# Patient Record
Sex: MALE | Employment: FULL TIME | URBAN - METROPOLITAN AREA
[De-identification: names, ages, dates, MRNs, and addresses within clinical notes are randomized per-mention and may not be internally consistent; named-entity substitution may affect disease eponyms.]

---

## 2017-03-16 ENCOUNTER — OFFICE VISIT (OUTPATIENT)
Dept: DERMATOLOGY | Facility: CLINIC | Age: 37
End: 2017-03-16

## 2017-03-16 DIAGNOSIS — L21.9 DERMATITIS, SEBORRHEIC: Primary | ICD-10-CM

## 2017-03-16 RX ORDER — CLOBETASOL PROPIONATE 0.05 G/100ML
SHAMPOO TOPICAL
Qty: 1 BOTTLE | Refills: 11 | Status: SHIPPED
Start: 2017-03-16 | End: 2019-08-20

## 2017-03-16 ASSESSMENT — PAIN SCALES - GENERAL: PAINLEVEL: NO PAIN (0)

## 2017-03-16 NOTE — LETTER
"3/16/2017       RE: Henrik Dempsey  [address]  [address]  [facility] [address]     Dear Colleague,    Thank you for referring your patient, Henrik Dempsey, to the [facility] DERMATOLOGY at [facility]. Please see a copy of my visit note below.    [facility] Dermatology Note      Dermatology Problem List:  1. Seborrheic dermatitis: clobetasol shampoo    Encounter Date: Mar 16, 2017    CC:   Chief Complaint   Patient presents with     Derm Problem     Henrik is here today for dandruff.  Says he has had it for \"years.\"           History of Present Illness:  Mr. Henrik Dempsey is a 36 year old male who presents in consultation for dandruff. Patient states he has had problems with dandruff for the last several years. This is accompanied by scalp discomfort and itching. Pt states he has tried ketoconazole shampoo, selenium sulfide shampoo, H&S shampoo. These will briefly control hsi symptoms but then significantly dry out his scalp, worsen his symptoms, and he will no longer be able to tolerate. Pt states he will sometimes get dry skin behind his ears as well. Pt otherwise feels well without any changes in general state of health. Denies any new, growing, changing, bleeding, or otherwise concerning/symptomatic skin findings. No other questions or concerns today.       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 The patient denies use of tanning beds.    Family History:  There is no family history of skin cancer.    Medications:  Current Outpatient Prescriptions   Medication Sig Dispense Refill     " acetaminophen (TYLENOL) 325 MG tablet Take 325-650 mg by mouth every 6 hours as needed for mild pain Reported on 3/16/2017          Allergies   Allergen Reactions     Penicillins Hives         Review of Systems:  -As per HPI  -Constitutional: The patient denies fatigue, fevers, chills, unintended weight loss, and night sweats.  -HEENT: Patient denies nonhealing oral sores.  -Skin: As above in HPI. No additional skin concerns.    Physical exam:  Vitals: There were no vitals taken for this visit.  GEN: This is a well developed, well-nourished female in no acute distress, in a pleasant mood.    SKIN: Focused examination of the scalp and face was performed.  - diffuse flaking scale throughout scalp  - no erythema or inflammation of scalp noted  - mild flaking scale involving eyebrows   - no other lesions of concern on areas examined.     Impression/Plan:  1. Seborrheic dermatitis: involving scalp. discussed various treatment options including ketoconazole, selenium sulfide, zinc based shampoos, and topical steroids. Patient has failed the aforementioned shampoos which actually seem to worsen his symptoms. Will plan to begin treatment with clobetasol shampoo with follow up PRN.     Begin clobetasol shampoo    Begin 2x weekly and increase to every other day if tolerating well    RTC PRN if no improvement and we will         Follow-up prn for new or changing lesions.       Dr. Titus Gomez staffed the patient.    Staff Involved:  Resident(Brian Patel)/Staff(as above)    I have seen and examined this patient and agree with the assessment and plan as documented in the resident's note.    Titus Gomez MD  Dermatology Attending      Again, thank you for allowing me to participate in the care of your patient.      Sincerely,    Titus Gomez MD

## 2017-03-16 NOTE — NURSING NOTE
"Dermatology Rooming Note    Henrik Dempsey's goals for this visit include:   Chief Complaint   Patient presents with     Derm Problem     Henrik is here today for dandruff.  Says he has had it for \"years.\"         Kirti Sun LPN    "

## 2017-03-16 NOTE — PROGRESS NOTES
"[facility] Dermatology Note      Dermatology Problem List:  1. Seborrheic dermatitis: clobetasol shampoo    Encounter Date: Mar 16, 2017    CC:   Chief Complaint   Patient presents with     Derm Problem     Henrik is here today for dandruff.  Says he has had it for \"years.\"           History of Present Illness:  Mr. Henrik Dempsey is a 36 year old male who presents in consultation for dandruff. Patient states he has had problems with dandruff for the last several years. This is accompanied by scalp discomfort and itching. Pt states he has tried ketoconazole shampoo, selenium sulfide shampoo, H&S shampoo. These will briefly control hsi symptoms but then significantly dry out his scalp, worsen his symptoms, and he will no longer be able to tolerate. Pt states he will sometimes get dry skin behind his ears as well. Pt otherwise feels well without any changes in general state of health. Denies any new, growing, changing, bleeding, or otherwise concerning/symptomatic skin findings. No other questions or concerns today.       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 The patient denies use of tanning beds.    Family History:  There is no family history of skin cancer.    Medications:  Current Outpatient Prescriptions   Medication Sig Dispense Refill     acetaminophen (TYLENOL) 325 MG tablet Take 325-650 mg by mouth every 6 hours as needed for mild pain Reported on 3/16/2017          Allergies   Allergen Reactions     Penicillins Hives         Review of Systems:  -As per HPI  -Constitutional: The patient denies fatigue, fevers, chills, " unintended weight loss, and night sweats.  -HEENT: Patient denies nonhealing oral sores.  -Skin: As above in HPI. No additional skin concerns.    Physical exam:  Vitals: There were no vitals taken for this visit.  GEN: This is a well developed, well-nourished female in no acute distress, in a pleasant mood.    SKIN: Focused examination of the scalp and face was performed.  - diffuse flaking scale throughout scalp  - no erythema or inflammation of scalp noted  - mild flaking scale involving eyebrows   - no other lesions of concern on areas examined.     Impression/Plan:  1. Seborrheic dermatitis: involving scalp. discussed various treatment options including ketoconazole, selenium sulfide, zinc based shampoos, and topical steroids. Patient has failed the aforementioned shampoos which actually seem to worsen his symptoms. Will plan to begin treatment with clobetasol shampoo with follow up PRN.     Begin clobetasol shampoo    Begin 2x weekly and increase to every other day if tolerating well    RTC PRN if no improvement and we will         Follow-up prn for new or changing lesions.       Dr. Titus Gomez staffed the patient.    Staff Involved:  Resident(Brian Patel)/Staff(as above)    I have seen and examined this patient and agree with the assessment and plan as documented in the resident's note.    Titus Gomez MD  Dermatology Attending

## 2017-03-16 NOTE — MR AVS SNAPSHOT
After Visit Summary   3/16/2017    Henrik Dempsey    MRN: [id number]           Patient Information     Date Of Birth          1980        Visit Information        Provider Department      3/16/2017 3:30 PM Titus Gomez MD [facility] Dermatology        Today's Diagnoses     Dermatitis, seborrheic    -  1       Follow-ups after your visit        Follow-up notes from your care team     Return if symptoms worsen or fail to improve.      Who to contact     Please call your clinic at [phone] to:    Ask questions about your health    Make or cancel appointments    Discuss your medicines    Learn about your test results    Speak to your doctor   If you have compliments or concerns about an experience at your clinic, or if you wish to file a complaint, please contact [facility] Patient Relations at [phone] or email us at [email or web address]         Additional Information About Your Visit        Care EveryWhere ID     This is your Care EveryWhere ID. This could be used by other organizations to access your [place] medical records  [id number]         Blood Pressure from Last 3 Encounters:   04/11/17 120/83   01/26/15 112/80    Weight from Last 3 Encounters:   04/11/17 68 kg (150 lb)   01/26/15 66.2 kg (146 lb)              Today, you had the following     No orders found for display         Today's Medication Changes          These changes are accurate as of: 3/16/17 11:59 PM.  If you have any questions, ask your nurse or doctor.               Start taking these medicines.        Dose/Directions    clobetasol propionate 0.05 % Sham   Used for:  Dermatitis, seborrheic   Started by:  Titus Gomez MD        Apply sparingly to dry scalp twice weekly, if tolerating well, OK to increase to every other day.  Leave in place for 15 minutes then add water, lather and rinse thoroughly.   Quantity:  1 Bottle   Refills:  11            Where to get your  medicines      [id number] - [place], [place] - [address]  [address], [facility] [address]     Phone:  [phone]     clobetasol propionate 0.05 % Sham                Primary Care Provider    None       No address on file        Thank you!     Thank you for choosing [facility] DERMATOLOGY  for your care. Our goal is always to provide you with excellent care. Hearing back from our patients is one way we can continue to improve our services. Please take a few minutes to complete the written survey that you may receive in the mail after your visit with us. Thank you!             Your Updated Medication List - Protect others around you: Learn how to safely use, store and throw away your medicines at www.disposemymeds.org.          This list is accurate as of: 3/16/17 11:59 PM.  Always use your most recent med list.                   Brand Name Dispense Instructions for use    clobetasol propionate 0.05 % Sham     1 Bottle    Apply sparingly to dry scalp twice weekly, if tolerating well, OK to increase to every other day.  Leave in place for 15 minutes then add water, lather and rinse thoroughly.       TYLENOL 325 MG tablet   Generic drug:  acetaminophen      Take 325-650 mg by mouth every 6 hours as needed for mild pain Reported on 3/16/2017

## 2017-03-16 NOTE — LETTER
Date:April 18, 2017      Patient was self referred, no letter generated. Do not send.        [facility] Health Information

## 2017-04-11 ENCOUNTER — OFFICE VISIT (OUTPATIENT)
Dept: OTOLARYNGOLOGY | Facility: CLINIC | Age: 37
End: 2017-04-11
Payer: COMMERCIAL

## 2017-04-11 ENCOUNTER — OFFICE VISIT (OUTPATIENT)
Dept: AUDIOLOGY | Facility: CLINIC | Age: 37
End: 2017-04-11
Payer: COMMERCIAL

## 2017-04-11 VITALS
WEIGHT: 150 LBS | SYSTOLIC BLOOD PRESSURE: 120 MMHG | DIASTOLIC BLOOD PRESSURE: 83 MMHG | HEIGHT: 66 IN | BODY MASS INDEX: 24.11 KG/M2 | HEART RATE: 67 BPM

## 2017-04-11 DIAGNOSIS — H93.293 ABNORMAL AUDITORY PERCEPTION OF BOTH EARS: Primary | ICD-10-CM

## 2017-04-11 DIAGNOSIS — H69.93 DYSFUNCTION OF EUSTACHIAN TUBE, BILATERAL: Primary | ICD-10-CM

## 2017-04-11 PROCEDURE — 99202 OFFICE O/P NEW SF 15 MIN: CPT | Mod: 25 | Performed by: OTOLARYNGOLOGY

## 2017-04-11 PROCEDURE — 92511 NASOPHARYNGOSCOPY: CPT | Performed by: OTOLARYNGOLOGY

## 2017-04-11 PROCEDURE — 92550 TYMPANOMETRY & REFLEX THRESH: CPT | Performed by: AUDIOLOGIST

## 2017-04-11 PROCEDURE — 92552 PURE TONE AUDIOMETRY AIR: CPT | Performed by: AUDIOLOGIST

## 2017-04-11 PROCEDURE — 92555 SPEECH THRESHOLD AUDIOMETRY: CPT | Performed by: AUDIOLOGIST

## 2017-04-11 RX ORDER — FLUTICASONE PROPIONATE 50 MCG
1-2 SPRAY, SUSPENSION (ML) NASAL DAILY
Qty: 1 BOTTLE | Refills: 11 | Status: SHIPPED | OUTPATIENT
Start: 2017-04-11 | End: 2019-08-20

## 2017-04-11 ASSESSMENT — ENCOUNTER SYMPTOMS
BRUISES/BLEEDS EASILY: 0
HEARTBURN: 0
VOMITING: 0
DIZZINESS: 0
COUGH: 0
TREMORS: 0
HEMOPTYSIS: 0
TINGLING: 0
BLURRED VISION: 0
CONSTITUTIONAL NEGATIVE: 1
NAUSEA: 0
DOUBLE VISION: 0
HEADACHES: 0

## 2017-04-11 NOTE — PROGRESS NOTES
HPI    This pleasant patient is having ear fullness, pressure. He is a physician, endocrinologist here. Especially in the left for the past several months. Noticed a small lymph node in the mastoid region. Denies any drainage, pain, tinnitus or vertigo. No bleeding from nose, no environmental allergies. He has no hx of hypertension or thyroid issues.    Review of Systems   Constitutional: Negative.    HENT: Negative for ear discharge, ear pain, hearing loss and tinnitus.    Eyes: Negative for blurred vision and double vision.   Respiratory: Negative for cough and hemoptysis.    Gastrointestinal: Negative for heartburn, nausea and vomiting.   Skin: Negative.    Neurological: Negative for dizziness, tingling, tremors and headaches.   Endo/Heme/Allergies: Negative for environmental allergies. Does not bruise/bleed easily.         Physical Exam   Constitutional: He is well-developed, well-nourished, and in no distress.   HENT:   Head: Normocephalic and atraumatic.   Right Ear: Tympanic membrane, external ear and ear canal normal. No drainage, swelling or tenderness. No middle ear effusion. No decreased hearing is noted.   Left Ear: Tympanic membrane, external ear and ear canal normal. No drainage, swelling or tenderness.  No middle ear effusion. No decreased hearing is noted.   Nose: Nose normal. No mucosal edema.   Mouth/Throat: Uvula is midline, oropharynx is clear and moist and mucous membranes are normal. No oropharyngeal exudate or posterior oropharyngeal edema.   Eyes: Pupils are equal, round, and reactive to light.   Neck: Neck supple. No tracheal deviation present. No thyromegaly present.   Lymphadenopathy:     He has no cervical adenopathy.     Diagnostic nasal endoscopy:     He was seen in the room and identified. Pros and cons of the procedure were explained to the patient. The procedure and its alternatives were explained to the patient in lay terms. His questions were answered. His symptoms required a  diagnostic endoscopic evaluation under local anesthesia. After obtaining an informed consent from the patient, 3% Lidocaine with 1: 100.000 epinephrine spray was applied to each nostril. Then a  scopic exam was performed with rigid scope. Septum is in the midline. Ostiomeatal complexes are free of disease. No pus or polyp seen. Inferior turbinates are enlarged. Nasopharynx is normal. Rosenmüller fossa and torus tubarius are normal. He tolerated the procedure well and left the room with no complications.    A/P  Hearing WNLs. Excellent word recognition. Tymps WNls. Reflexes are present.  ETD: aural fullness: CBC requested to r/o anemia.   I will him a topical nasal steroid once a day and see him in the f/u.

## 2017-04-11 NOTE — MR AVS SNAPSHOT
After Visit Summary   4/11/2017    Henrik Dempsey    MRN: [id number]           Patient Information     Date Of Birth          1980        Visit Information        Provider Department      4/11/2017 11:00 AM Marques Santamaria MD [facility]        Today's Diagnoses     Dysfunction of eustachian tube, bilateral    -  1       Follow-ups after your visit        Additional Services     AUDIOLOGY ADULT REFERRAL       Your provider has referred you to: [facility]: [facility] [phone]   [email or web address]    Treatment:  Evaluation & Treatment  Specialty Testing:  Audiogram w/Tymps and Reflexes    Please be aware that coverage of these services is subject to the terms and limitations of your health insurance plan.  Call member services at your health plan with any benefit or coverage questions.      Please bring the following to your appointment:  >>   Any x-rays, CTs or MRIs which have been performed.  Contact the facility where they were done to arrange for  prior to your scheduled appointment.   >>   List of current medications  >>   This referral request   >>   Any documents/labs given to you for this referral                  Future tests that were ordered for you today     Open Future Orders        Priority Expected Expires Ordered    CBC with platelets and differential Routine 4/11/2017 4/11/2018 4/11/2017            Who to contact     If you have questions or need follow up information about today's clinic visit or your schedule please contact [facility] directly at [phone].  Normal or non-critical lab and imaging results will be communicated to you by MyChart, letter or phone within 4 business days after the clinic has received the results. If you do not hear from us within 7 days, please contact the clinic through MyChart or phone. If you have a critical or  "abnormal lab result, we will notify you by phone as soon as possible.  Submit refill requests through [email or web address] or call your pharmacy and they will forward the refill request to us. Please allow 3 business days for your refill to be completed.          Additional Information About Your Visit        Care EveryWhere ID     This is your Care EveryWhere ID. This could be used by other organizations to access your [place] medical records  [id number]        Your Vitals Were     Pulse Height BMI (Body Mass Index)             67 1.676 m (5' 6\") 24.21 kg/m2          Blood Pressure from Last 3 Encounters:   04/11/17 120/83   01/26/15 112/80    Weight from Last 3 Encounters:   04/11/17 68 kg (150 lb)   01/26/15 66.2 kg (146 lb)              We Performed the Following     AUDIOLOGY ADULT REFERRAL     Nasal Endoscopy, Diag          Today's Medication Changes          These changes are accurate as of: 4/11/17  2:00 PM.  If you have any questions, ask your nurse or doctor.               Start taking these medicines.        Dose/Directions    fluticasone 50 MCG/ACT spray   Commonly known as:  FLONASE   Used for:  Dysfunction of eustachian tube, bilateral   Started by:  Marques Santamaria MD        Dose:  1-2 spray   Spray 1-2 sprays into both nostrils daily   Quantity:  1 Bottle   Refills:  11         Stop taking these medicines if you haven't already. Please contact your care team if you have questions.     TYLENOL 325 MG tablet   Generic drug:  acetaminophen   Stopped by:  Marques Santamaria MD                Where to get your medicines      These medications were sent to [facility]  [place] - [address] AT [facility] [place] [address]     Phone:  [phone]     fluticasone 50 MCG/ACT spray                Primary Care Provider    None       No address on file        Thank you!     Thank you for choosing [facility]  for your care. Our goal " is always to provide you with excellent care. Hearing back from our patients is one way we can continue to improve our services. Please take a few minutes to complete the written survey that you may receive in the mail after your visit with us. Thank you!             Your Updated Medication List - Protect others around you: Learn how to safely use, store and throw away your medicines at www.disposemymeds.org.          This list is accurate as of: 4/11/17  2:00 PM.  Always use your most recent med list.                   Brand Name Dispense Instructions for use    clobetasol propionate 0.05 % Sham     1 Bottle    Apply sparingly to dry scalp twice weekly, if tolerating well, OK to increase to every other day.  Leave in place for 15 minutes then add water, lather and rinse thoroughly.       fluticasone 50 MCG/ACT spray    FLONASE    1 Bottle    Spray 1-2 sprays into both nostrils daily

## 2017-04-11 NOTE — PROGRESS NOTES
AUDIOLOGY REPORT    SUMMARY: Audiology visit completed. See audiogram for results.    RECOMMENDATIONS: Follow-up with ENT.    Holly Sinha  Doctor of Audiology  [place] License # [id number]

## 2017-04-11 NOTE — NURSING NOTE
"Henrik Dempsey's goals for this visit include:   Chief Complaint   Patient presents with     Consult     Fullness in left ear       He requests these members of his care team be copied on today's visit information: yes      PCP: None    Referring Provider:  Referred Self, MD  No address on file    Chief Complaint   Patient presents with     Consult     Fullness in left ear       Initial /83  Pulse 67  Ht 1.676 m (5' 6\")  Wt 68 kg (150 lb)  BMI 24.21 kg/m2 Estimated body mass index is 24.21 kg/(m^2) as calculated from the following:    Height as of this encounter: 1.676 m (5' 6\").    Weight as of this encounter: 68 kg (150 lb).  Medication Reconciliation: complete    "

## 2017-04-11 NOTE — MR AVS SNAPSHOT
After Visit Summary   4/11/2017    Henrik Dempsey    MRN: [id number]           Patient Information     Date Of Birth          1980        Visit Information        Provider Department      4/11/2017 10:30 AM Blake Beltrán AuD [facility]        Today's Diagnoses     Abnormal auditory perception of both ears    -  1       Follow-ups after your visit        Who to contact     If you have questions or need follow up information about today's clinic visit or your schedule please contact [facility] directly at [phone].  Normal or non-critical lab and imaging results will be communicated to you by MyChart, letter or phone within 4 business days after the clinic has received the results. If you do not hear from us within 7 days, please contact the clinic through MyChart or phone. If you have a critical or abnormal lab result, we will notify you by phone as soon as possible.  Submit refill requests through [employer] or call your pharmacy and they will forward the refill request to us. Please allow 3 business days for your refill to be completed.          Additional Information About Your Visit        Care EveryWhere ID     This is your Care EveryWhere ID. This could be used by other organizations to access your [place] medical records  [id number]         Blood Pressure from Last 3 Encounters:   04/11/17 120/83   01/26/15 112/80    Weight from Last 3 Encounters:   04/11/17 150 lb (68 kg)   01/26/15 146 lb (66.2 kg)              We Performed the Following     AUDIOGRAM/TYMPANOGRAM - INTERFACE     AUDIOM THRESHOLD     PURE TONE AUDIOMETRY, AIR     TYMPANOMETRY AND REFLEX THRESHOLD MEASUREMENTS          Today's Medication Changes          These changes are accurate as of: 4/11/17 12:52 PM.  If you have any questions, ask your nurse or doctor.               Start taking these medicines.        Dose/Directions    fluticasone 50 MCG/ACT spray   Commonly known as:   FLONASE   Used for:  Dysfunction of eustachian tube, bilateral   Started by:  Marques Santamaria MD        Dose:  1-2 spray   Spray 1-2 sprays into both nostrils daily   Quantity:  1 Bottle   Refills:  11         Stop taking these medicines if you haven't already. Please contact your care team if you have questions.     TYLENOL 325 MG tablet   Generic drug:  acetaminophen   Stopped by:  Marques Santamaria MD                Where to get your medicines      These medications were sent to [facility], [place] - [address] AT [facility] [address]  [facility] [address]     Phone:  [phone]     fluticasone 50 MCG/ACT spray                Primary Care Provider    None       No address on file        Thank you!     Thank you for choosing [facility]  for your care. Our goal is always to provide you with excellent care. Hearing back from our patients is one way we can continue to improve our services. Please take a few minutes to complete the written survey that you may receive in the mail after your visit with us. Thank you!             Your Updated Medication List - Protect others around you: Learn how to safely use, store and throw away your medicines at www.disposemymeds.org.          This list is accurate as of: 4/11/17 12:52 PM.  Always use your most recent med list.                   Brand Name Dispense Instructions for use    clobetasol propionate 0.05 % Sham     1 Bottle    Apply sparingly to dry scalp twice weekly, if tolerating well, OK to increase to every other day.  Leave in place for 15 minutes then add water, lather and rinse thoroughly.       fluticasone 50 MCG/ACT spray    FLONASE    1 Bottle    Spray 1-2 sprays into both nostrils daily

## 2017-04-16 PROBLEM — L21.9 DERMATITIS, SEBORRHEIC: Status: ACTIVE | Noted: 2017-04-16

## 2018-06-18 ENCOUNTER — OFFICE VISIT (OUTPATIENT)
Dept: ORTHOPEDICS | Facility: CLINIC | Age: 38
End: 2018-06-18
Payer: COMMERCIAL

## 2018-06-18 VITALS — HEART RATE: 77 BPM | SYSTOLIC BLOOD PRESSURE: 133 MMHG | DIASTOLIC BLOOD PRESSURE: 91 MMHG | HEIGHT: 66 IN

## 2018-06-18 DIAGNOSIS — S43.421A SPRAIN OF RIGHT ROTATOR CUFF CAPSULE, INITIAL ENCOUNTER: Primary | ICD-10-CM

## 2018-06-18 NOTE — PROGRESS NOTES
"[facility] Sports and Orthopedic Walk-in Clinic Note      Patient is a 37 year old male who presents to the office today for: right shoulder pain.  Head injury to right shoulder number years ago that eventually improved with physical therapy.  Has had intermittent pain in the posterior right shoulder since that time.  However of course the last week he has had increased pain.  Localizes the posterior shoulder.  Attributed to playing more tennis recently which he would like to continue doing.  No specific injury or trauma at the current time.  No pain at rest.    Denies weakness, numbness, tingling, clicking, locking, or catching.      Past Medical History, Current Medications, and Allergies are reviewed in the electronic medical record as appropriate.     ROS: Pertinent items are noted in HPI.  Constitutional: negative for fevers, chills and malaise  Cardiovascular: negative for dyspnea, fatigue, lower extremity edema  Integument/breast: negative for rash, skin lesion(s) and skin color change  Neurological: negative for paresthesia and weakness      EXAM:BP (!) 133/91  Pulse 77  Ht 5' 6\" (1.676 m)    General: Alert, pleasant, no distress  Right shoulder: warm, well perfused, SILT throughout     Inspection: No soft tissue swelling, effusion, erythema, ecchymosis, deformity.     ROM: Full and symmetric AROM and PROM without pain.     Strength: 5/5 and symmetric with flexion, abduction, IR, ER.     Palpation: TTP over the posterior glenoid.  No TTP over the AC joint, bicipital groove, scapular.     Special Tests: Positive [place].  Negative Guzman negative Neer negative Speed negative Yergason.  Negative crank, apprehension, biceps load test.      Imaging: No imaging.      Assessment: Patient is a 37 year old male with recurrent right shoulder pain after playing tennis 1 week ago.  Suspect he may have an underlying labral injury but also demonstrating some mild signs of impingement currently.    Recommendations: "   Reviewed assessment the patient in detail  Given home exercises and recommended physical therapy  Okay to continue activity as tolerated by pain.  Recommended icing after activity  Okay to use NSAIDs or acetaminophen as needed   Follow-up in 4-6 weeks if symptoms not improving or if new or worsening symptoms    Cristian Spaulding MD

## 2018-06-18 NOTE — MR AVS SNAPSHOT
"              After Visit Summary   2018    Henrik Dempsey    MRN: [id number]           Patient Information     Date Of Birth          1980        Visit Information        Provider Department      2018 10:40 AM Cristian Spaulding MD [facility] Sports and Orthopaedic Walk In Clinic        Today's Diagnoses     Sprain of right rotator cuff capsule, initial encounter    -  1       Follow-ups after your visit        Additional Services     PHYSICAL THERAPY REFERRAL (Internal)       Physical Therapy Referral                  Who to contact     Please call your clinic at [phone] to:    Ask questions about your health    Make or cancel appointments    Discuss your medicines    Learn about your test results    Speak to your doctor            Additional Information About Your Visit        MyChart Information     Portal Profest is an electronic gateway that provides easy, online access to your medical records. With [employer], you can request a clinic appointment, read your test results, renew a prescription or communicate with your care team.     To sign up for Portal Profest visit the website at [email or web address]   You will be asked to enter the access code listed below, as well as some personal information. Please follow the directions to create your username and password.     Your access code is: [email or web address]  Expires: 2018  2:03 PM     Your access code will  in 90 days. If you need help or a new code, please contact your [facility] or call [phone] for assistance.        Care EveryWhere ID     This is your Care EveryWhere ID. This could be used by other organizations to access your [place] medical records  [id number]        Your Vitals Were     Pulse Height                77 5' 6\" (1.676 m)           Blood Pressure from Last 3 Encounters:   18 (!) 133/91   17 120/83   01/26/15 112/80    Weight from Last 3 Encounters:   17 150 lb (68 " kg)   01/26/15 146 lb (66.2 kg)              We Performed the Following     PHYSICAL THERAPY REFERRAL (Internal)        Primary Care Provider Fax #    Physician No Ref-Primary [phone]       No address on file        Equal Access to Services     JAVIER JANNA : Keturah tomer reza genesis Jamison, wakelsieda luqadaha, qahillta kaalmada jasmyn, janes youngersantos tang. So [facility] [place] [phone].    ATENCIÓN: Si habla español, tiene a abdul disposición servicios gratuitos de asistencia lingüística. Llame al [phone].    We comply with applicable federal civil rights laws and [place] laws. We do not discriminate on the basis of race, color, national origin, age, disability, sex, sexual orientation, or gender identity.            Thank you!     Thank you for choosing [facility] SPORTS AND ORTHOPAEDIC WALK IN CLINIC  for your care. Our goal is always to provide you with excellent care. Hearing back from our patients is one way we can continue to improve our services. Please take a few minutes to complete the written survey that you may receive in the mail after your visit with us. Thank you!             Your Updated Medication List - Protect others around you: Learn how to safely use, store and throw away your medicines at www.disposemymeds.org.          This list is accurate as of 6/18/18  2:03 PM.  Always use your most recent med list.                   Brand Name Dispense Instructions for use Diagnosis    clobetasol propionate 0.05 % Sham     1 Bottle    Apply sparingly to dry scalp twice weekly, if tolerating well, OK to increase to every other day.  Leave in place for 15 minutes then add water, lather and rinse thoroughly.    Dermatitis, seborrheic       fluticasone 50 MCG/ACT spray    FLONASE    1 Bottle    Spray 1-2 sprays into both nostrils daily    Dysfunction of Eustachian tube, bilateral

## 2018-06-18 NOTE — PROGRESS NOTES
SPORTS & ORTHOPEDIC WALK-IN VISIT 6/18/2018    Primary Care Physician: Dr. Horner in posterior shoulder. History of supraspinatus injury 8-10 years ago. Ever since that time hasn't been able to play ping pong, difficulty with tennis. Played tennis yesterday and has had a lot of pain since then.     Reason for visit:     What part of your body is injured / painful?  right shoulder    What caused the injury /pain? Overuse injury from regular activity    How long ago did your injury occur or pain begin? yesterday    What are your most bothersome symptoms? Pain    How would you characterize your symptom?  tender    What makes your symptoms better? Rest and Tylenol    What makes your symptoms worse? Movement    Have you been previously seen for this problem? No    Medical History:    Any recent changes to your medical history? No    Any new medication prescribed since last visit? No    Have you had surgery on this body part before? No    Social History:    Occupation: Physician in endocrinology    Handedness: Right    Exercise: 4-5 days/week    Review of Systems:    Do you have fever, chills, weight loss? No    Do you have any vision problems? No    Do you have any chest pain or edema? No    Do you have any shortness of breath or wheezing?  No    Do you have stomach problems? No    Do you have any numbness or focal weakness? No    Do you have diabetes? No    Do you have problems with bleeding or clotting? No    Do you have an rashes or other skin lesions? No

## 2018-06-25 ENCOUNTER — THERAPY VISIT (OUTPATIENT)
Dept: PHYSICAL THERAPY | Facility: CLINIC | Age: 38
End: 2018-06-25
Payer: COMMERCIAL

## 2018-06-25 DIAGNOSIS — M25.519 SHOULDER PAIN: Primary | ICD-10-CM

## 2018-06-25 PROCEDURE — 97161 PT EVAL LOW COMPLEX 20 MIN: CPT | Mod: GP | Performed by: PHYSICAL THERAPIST

## 2018-06-25 PROCEDURE — 97110 THERAPEUTIC EXERCISES: CPT | Mod: GP | Performed by: PHYSICAL THERAPIST

## 2018-06-25 NOTE — PROGRESS NOTES
[facility] Initial Evaluation  Subjective:  [facility]  Physical Therapy Initial Examination/Evaluation  June 25, 2018    Henrik Dempsey is a 37 year old  male referred to physical therapy by Dr. Spaulding for treatment of shoulder pain with Precautions/Restrictions/MD instructions none    Subjective:  Referring MD visit date: 6/18/18  DOI/onset: June 2018  Mechanism of injury: Patient began playing tennis about one month ago.  Eight days ago, he played a lot of tennis and ping pong and felt the onset of pain.  Patient notes that he had a shoulder injury several years ago secondary to swimming.    DOS none  Previous treatment: PT, US  Imaging: none  Chief Complaint:   Pain with tennis, reaching across chest   Pain: rest 1 /10, activity 7/10 posterior shoulder, anterior shoulder Described as: aching Alleviated by: rest Frequency: intermittent Progression of symptoms since initial onset: improving Time of day when pain is worse: not related  Sleeping: not affected    Occupation: MD - endocrinology  Job duties:  Sitting, standing    Current HEP/exercise regimen: tennis, running, cross training, swimming  Patient's goals are decrease pain    Pertinent PMH:  None   General Health Reported by Patient: Good  Return to MD:  PRN     Objective:  System                   Shoulder Evaluation:  ROM:  AROM:  normal                            PROM:  normal                                Strength:  : Middle trapezius: 5-/5 on L, 4+/5 on R; Rhomboids: 5-/5 on L, 4+/5 on R; Lower trapezius: 5-/5 B.  Flexion: Left:5/5    Pain: -    Right: 5/5      Pain:  -    Abduction:  Left: 5/5   Pain:-    Right: 5/5      Pain:+    Internal Rotation:  Left:5/5      Pain:-    Right: 5/5      Pain:-  External Rotation:   Left:5-/5      Pain:-   Right:5-/5      Pain:-        Elbow Flexion:  Left:5/5      Pain:-    Right:5/5      Pain:-  Elbow  Extension:  Left:5/5      Pain:-    Right:5/5      Pain:-    Special Tests:      Right shoulder positive for the following special tests:Impingement (Guzman Jonny positive; Tom Reddy's negative)  Right shoulder negative for the following special tests:Labral  Palpation:      Right shoulder tenderness present at: Infraspinatus and Teres Minor                                     General     ROS    Assessment/Plan:    Patient is a 37 year old male with right side shoulder complaints.    Patient has the following significant findings with corresponding treatment plan.                Diagnosis 1:  Shoulder pain    Pain -  hot/cold therapy, US, electric stimulation, manual therapy, splint/taping/bracing/orthotics, self management, education and home program  Decreased strength - therapeutic exercise and therapeutic activities  Decreased proprioception - neuro re-education and therapeutic activities  Impaired muscle performance - neuro re-education  Decreased function - therapeutic activities    Therapy Evaluation Codes:   1) History comprised of:   Personal factors that impact the plan of care:      None.    Comorbidity factors that impact the plan of care are:      None.     Medications impacting care: None.  2) Examination of Body Systems comprised of:   Body structures and functions that impact the plan of care:      Shoulder.   Activity limitations that impact the plan of care are:      Sports and Reaching.  3) Clinical presentation characteristics are:   Stable/Uncomplicated.  4) Decision-Making    Low complexity using standardized patient assessment instrument and/or measureable assessment of functional outcome.  Cumulative Therapy Evaluation is: Low complexity.    Previous and current functional limitations:  (See Goal Flow Sheet for this information)    Short term and Long term goals: (See Goal Flow Sheet for this information)     Communication ability:  Patient appears to be able to clearly communicate and  understand verbal and written communication and follow directions correctly.  Treatment Explanation - The following has been discussed with the patient:   RX ordered/plan of care  Anticipated outcomes  Possible risks and side effects  This patient would benefit from PT intervention to resume normal activities.   Rehab potential is good.    Frequency:  1 X week, once daily  Duration:  for 2 weeks tapering to 2 X a month over 8 weeks  Discharge Plan:  Achieve all LTG.  Independent in home treatment program.  Reach maximal therapeutic benefit.    Please refer to the daily flowsheet for treatment today, total treatment time and time spent performing 1:1 timed codes.

## 2018-06-25 NOTE — MR AVS SNAPSHOT
"              After Visit Summary   6/25/2018    Henrik Dempsey    MRN: [id number]           Patient Information     Date Of Birth          1980        Visit Information        Provider Department      6/25/2018 9:40 AM Gayle Benjamin PT [facility]        Today's Diagnoses     Shoulder pain    -  1       Follow-ups after your visit        Your next 10 appointments already scheduled     Jul 03, 2018 12:20 PM CDT   [facility] Extremity with [facility] ([facility])    [address] [place] [id number] [phone]              Who to contact     If you have questions or need follow up information about today's clinic visit or your schedule please contact [facility] PHYSICAL THERAPY [facility] directly at [phone].  Normal or non-critical lab and imaging results will be communicated to you by MyChart, letter or phone within 4 business days after the clinic has received the results. If you do not hear from us within 7 days, please contact the clinic through MyChart or phone. If you have a critical or abnormal lab result, we will notify you by phone as soon as possible.  Submit refill requests through Igea or call your pharmacy and they will forward the refill request to us. Please allow 3 business days for your refill to be completed.          Additional Information About Your Visit        MyChart Information     Igea lets you send messages to your doctor, view your test results, renew your prescriptions, schedule appointments and more. To sign up, go to [email or web address] . Click on \"Log in\" on the left side of the screen, which will take you to the Welcome page. Then click on \"Sign up Now\" on the right side of the page.     You will be asked to enter the access code listed below, as well as some personal information. Please follow the directions to create your username and password.   "   Your access code is: [email or web address]  Expires: 2018  2:03 PM     Your access code will  in 90 days. If you need help or a new code, please call your [place] clinic or [phone].        Care EveryWhere ID     This is your Care EveryWhere ID. This could be used by other organizations to access your [place] medical records  [id number]         Blood Pressure from Last 3 Encounters:   18 (!) 133/91   17 120/83   01/26/15 112/80    Weight from Last 3 Encounters:   17 68 kg (150 lb)   01/26/15 66.2 kg (146 lb)              We Performed the Following     HC PT EVAL, LOW COMPLEXITY     [facility] INITIAL EVAL REPORT     THERAPEUTIC EXERCISES        Primary Care Provider Fax #    Physician No Ref-Primary [phone]       No address on file        Equal Access to Services     [facility] : Hadblu Jamison, wazulema castañeda, qaybta kaalmawilian vines, janes tran . So [facility] [phone].    ATENCIÓN: Si habla español, tiene a abdul disposición servicios gratuitos de asistencia lingüística. Llame al [phone].    We comply with applicable federal civil rights laws and [place] laws. We do not discriminate on the basis of race, color, national origin, age, disability, sex, sexual orientation, or gender identity.            Thank you!     Thank you for choosing [facility] PHYSICAL THERAPY [facility]  for your care. Our goal is always to provide you with excellent care. Hearing back from our patients is one way we can continue to improve our services. Please take a few minutes to complete the written survey that you may receive in the mail after your visit with us. Thank you!             Your Updated Medication List - Protect others around you: Learn how to safely use, store and throw away your medicines at www.disposemymeds.org.          This list is accurate as of 18 10:22 AM.  Always use your most recent med list.                   Brand Name Dispense  Instructions for use Diagnosis    clobetasol propionate 0.05 % Sham     1 Bottle    Apply sparingly to dry scalp twice weekly, if tolerating well, OK to increase to every other day.  Leave in place for 15 minutes then add water, lather and rinse thoroughly.    Dermatitis, seborrheic       fluticasone 50 MCG/ACT spray    FLONASE    1 Bottle    Spray 1-2 sprays into both nostrils daily    Dysfunction of Eustachian tube, bilateral

## 2018-12-07 DIAGNOSIS — L08.9 LOCAL INFECTION OF SKIN AND SUBCUTANEOUS TISSUE: ICD-10-CM

## 2018-12-07 DIAGNOSIS — L08.9 LOCAL INFECTION OF SKIN AND SUBCUTANEOUS TISSUE: Primary | ICD-10-CM

## 2018-12-07 RX ORDER — IVERMECTIN 3 MG/1
3 TABLET ORAL ONCE
Qty: 5 TABLET | Refills: 2 | Status: SHIPPED | OUTPATIENT
Start: 2018-12-07 | End: 2018-12-07

## 2018-12-07 RX ORDER — IVERMECTIN 3 MG/1
TABLET ORAL
Qty: 4 TABLET | Refills: 2 | Status: SHIPPED | OUTPATIENT
Start: 2018-12-07 | End: 2019-08-20

## 2018-12-07 RX ORDER — IVERMECTIN 3 MG/1
3 TABLET ORAL
Qty: 12 TABLET | Refills: 1 | Status: SHIPPED | OUTPATIENT
Start: 2018-12-07 | End: 2018-12-07

## 2019-04-12 PROBLEM — M25.519 SHOULDER PAIN: Status: RESOLVED | Noted: 2018-06-25 | Resolved: 2019-04-12

## 2019-08-18 ENCOUNTER — OFFICE VISIT (OUTPATIENT)
Dept: OPHTHALMOLOGY | Facility: CLINIC | Age: 39
End: 2019-08-18
Payer: COMMERCIAL

## 2019-08-18 DIAGNOSIS — H47.399 OPTIC DISC CUPPING NOT DUE TO GLAUCOMA: ICD-10-CM

## 2019-08-18 DIAGNOSIS — H53.10 SUBJECTIVE VISUAL DISTURBANCE OF RIGHT EYE: ICD-10-CM

## 2019-08-18 DIAGNOSIS — S05.8X1A COMMOTIO RETINAE OF RIGHT EYE, INITIAL ENCOUNTER: Primary | ICD-10-CM

## 2019-08-18 ASSESSMENT — CONF VISUAL FIELD
OD_NORMAL: 1
OS_NORMAL: 1

## 2019-08-18 ASSESSMENT — TONOMETRY
OD_IOP_MMHG: 15
IOP_METHOD: TONOPEN
OS_IOP_MMHG: 14

## 2019-08-18 ASSESSMENT — CUP TO DISC RATIO
OD_RATIO: 0.55
OS_RATIO: 0.55

## 2019-08-18 ASSESSMENT — VISUAL ACUITY
OD_SC: 20/20
OS_SC: 20/20
METHOD: SNELLEN - LINEAR

## 2019-08-18 ASSESSMENT — EXTERNAL EXAM - RIGHT EYE: OD_EXAM: NORMAL

## 2019-08-18 ASSESSMENT — SLIT LAMP EXAM - LIDS
COMMENTS: NORMAL
COMMENTS: NORMAL

## 2019-08-18 ASSESSMENT — EXTERNAL EXAM - LEFT EYE: OS_EXAM: NORMAL

## 2019-08-19 NOTE — PROGRESS NOTES
OPHTHALMOLOGY OFFICE NOTE  08/18/19    Patient: Henrik Dempsey      HISTORY OF PRESENTING ILLNESS:     Henrik Dempsey is a 39 year old male who was struck with a tennis ball in the right eye around 2.5 hours prior to presentation.  Immediately after being struck, he developed a dark spot in his vision superiorly from 11:00 to 2:00 in the right eye.  Denies associated flashes of light.  No floaters.  This spot has remained stable since onset.  He had mild discomfort from the injury initially, which has continued to improve with time.  He has some redness inferotemporally in the right eye where the ball struck him.  He states that his visual acuity is unaffected.      Review of systems were otherwise negative except for that which has been stated above.    OCULAR/MEDICAL/SURGICAL HISTORIES:     Past Ocular History:  - Remote history of episcleritis without uveitis, not in recent years     Past Medical History:   Diagnosis Date     Episcleritis of left eye     single episode in medical school     NO ACTIVE PROBLEMS        Past Surgical History:   Procedure Laterality Date     HERNIA REPAIR      as child       EXAMINATION:     Base Eye Exam     Visual Acuity (Snellen - Linear)       Right Left    Dist sc 20/20 20/20          Tonometry (Tonopen, 9:32 PM)       Right Left    Pressure 15 14          Pupils       Pupils Dark Light Shape React APD    Right PERRL 5 3 Round Brisk None    Left PERRL 5 3 Round Brisk None          Visual Fields       Left Right     Full Full          Extraocular Movement       Right Left     Full Full          Neuro/Psych     Oriented x3:  Yes    Mood/Affect:  Normal          Dilation     Both eyes:  1.0% Mydriacyl, 2.5% Juan Synephrine @ 9:32 PM            Slit Lamp and Fundus Exam     External Exam       Right Left    External Normal Normal          Slit Lamp Exam       Right Left    Lids/Lashes Normal Normal    Conjunctiva/Sclera nasal ping, infratemporal 1-2+ conj injection nasal  ping, white and quiet    Cornea Clear Clear    Anterior Chamber Deep and quiet, no cell/flare Deep and quiet    Iris Round and reactive Round and reactive    Lens Clear Clear    Vitreous Normal, zeke's negative Normal          Fundus Exam       Right Left    Disc Tilted, thinnest temporally Tilted, thinnest temporally    C/D Ratio 0.55 0.55    Macula Normal Normal    Vessels Normal Normal    Periphery no breaks or tears, no RD.  Small area of commotio retinae in the inferiortemporal region around 8:00 Normal                     ASSESSMENT/PLAN:     Henrik Dempsey is a 39 year old male who was struck with a tennis ball in the right eye around 2.5 hours prior to presentation, with a dark spot in his vision from 11:00 to 2:00 with a corresponding small peripheral area of commotio retinae at 8:00.  His VA is 20/20 each eye, CVF intact, no cell/flare, no VH, zeke's sign negative, no RD/peripheral breaks/peripheral tears on DFE with scleral depression.  It is likely his visual disturbance is related to the small area of commotio retinae.    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  He is a physician with high medical literacy  - AT's as needed for symptomatic relief    #Enlarged cup-to-disc bilaterally  Patient aware from prior.  IOP has never been elevated in the past, and he is being watched for ocular hypertension/glaucoma.  IOP 15/14 this visit  - May warrant routine testing with OCT RNFL and VF testing.  Can follow up in clinic for monitoring or with regular eye MD    It is our pleasure to participate in this patient's care and treatment. Please contact us with any further questions or concerns.    Seen and Discussed with Dr. ZHENG Rodney MD  PGY2, Ophthalmology

## 2019-08-20 ENCOUNTER — OFFICE VISIT (OUTPATIENT)
Dept: OPHTHALMOLOGY | Facility: CLINIC | Age: 39
End: 2019-08-20
Attending: OPHTHALMOLOGY
Payer: COMMERCIAL

## 2019-08-20 DIAGNOSIS — H53.10 SUBJECTIVE VISUAL DISTURBANCE OF RIGHT EYE: ICD-10-CM

## 2019-08-20 DIAGNOSIS — S05.8X1A COMMOTIO RETINAE OF RIGHT EYE, INITIAL ENCOUNTER: Primary | ICD-10-CM

## 2019-08-20 PROCEDURE — 92020 GONIOSCOPY: CPT | Mod: ZF | Performed by: OPHTHALMOLOGY

## 2019-08-20 PROCEDURE — G0463 HOSPITAL OUTPT CLINIC VISIT: HCPCS | Mod: ZF

## 2019-08-20 PROCEDURE — 92134 CPTRZ OPH DX IMG PST SGM RTA: CPT | Mod: ZF | Performed by: OPHTHALMOLOGY

## 2019-08-20 PROCEDURE — 92250 FUNDUS PHOTOGRAPHY W/I&R: CPT | Mod: ZF | Performed by: OPHTHALMOLOGY

## 2019-08-20 ASSESSMENT — EXTERNAL EXAM - RIGHT EYE: OD_EXAM: NORMAL

## 2019-08-20 ASSESSMENT — VISUAL ACUITY
METHOD: SNELLEN - LINEAR
OS_SC: 20/20
OD_SC: 20/20

## 2019-08-20 ASSESSMENT — TONOMETRY
IOP_METHOD: TONOPEN
OS_IOP_MMHG: 20
OD_IOP_MMHG: 15

## 2019-08-20 ASSESSMENT — SLIT LAMP EXAM - LIDS
COMMENTS: NORMAL
COMMENTS: NORMAL

## 2019-08-20 ASSESSMENT — CUP TO DISC RATIO
OD_RATIO: 0.55
OS_RATIO: 0.55

## 2019-08-20 ASSESSMENT — EXTERNAL EXAM - LEFT EYE: OS_EXAM: NORMAL

## 2019-08-20 ASSESSMENT — CONF VISUAL FIELD: OD_SUPERIOR_NASAL_RESTRICTION: 3

## 2019-08-20 NOTE — NURSING NOTE
Chief Complaints and History of Present Illnesses   Patient presents with     Eye Trauma Right Eye     Chief Complaint(s) and History of Present Illness(es)     Eye Trauma Right Eye     Laterality: right eye    Type of trauma: blunt    Duration: 2 days              Comments     Chief Complaints and History of Present Illnesses   Patient presents with     Eye Trauma Right Eye     Chief Complaint(s) and History of Present Illness(es)     Eye Trauma Right Eye     Laterality: right eye    Type of trauma: blunt    Duration: 2 days    Associated signs and symptoms: eye pain.  Negative for double vision, photophobia, floaters and flashing lights    Pain scale: 5/10 (tenderness)    Course: stable              Comments     Trauma to RE with tennis ball. Seen by Dr. Rodney on 8/18, same day as trauma event.  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

## 2019-08-20 NOTE — PROGRESS NOTES
CC -   F/u from [facility]    INTERVAL HISTORY - Initial visit with me, no clear change since injury    [facility] -   Henrik Dempsey is a  39 year old year-old patient referred by Dr Rodney. He was struck with a tennis ball in the right eye around 8-18-19. Superior VFD after impact.     Immediately after being struck, he developed a dark spot in his vision superiorly from 11:00 to 2:00 in the right eye.  Denies associated flashes of light.  No floaters.  This spot has remained stable since onset.  He had mild discomfort from the injury initially, which has continued to improve with time.  He has some redness inferotemporally in the right eye where the ball struck him.  He states that his visual acuity is unaffected.      Notes most problematic in low light situations. Reports spot is gray, and that he can see through the spot when he looks through it. Thinks spot is becoming less noticeable. No flashes, floaters. No distortion.   POHx: episcleritis ~15 years ago, treated w/ steroid gtts, had steroid-induced IOP spike, no recurrence since ~2015    PAST OCULAR SURGERY  None    RETINAL IMAGING:  OCT  8/20/19  OD - normal, PHF attached  OS - normal, PHF attached      ASSESSMENT & PLAN    1. Blunt trauma OD   - hit by tennis ball 8/2019   - mild commotio, no RD/RT, no hyphema, gonio OD normal     - advised S/Sx RD 8/2019    2. Commotio OD   - inferior, noticing VFD   - observe      3. 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 Vitreous syneresis OU        return to clinic: 2-3 month for repeat DFE, OCT OU    Senthil Le MD - PGY 3  Ophthalmology resident      ATTESTATION     Attending Physician Attestation:      Complete documentation of historical and exam elements from today's encounter can be found in the full encounter summary report (not reduplicated in this progress note).  I personally obtained the chief complaint(s) and history of present illness.  I confirmed and edited as necessary the review of  systems, past medical/surgical history, family history, social history, and examination findings as documented by others; and I examined the patient myself.  I personally reviewed the relevant tests, images, and reports as documented above.  I personally reviewed the ophthalmic test(s) associated with this encounter, agree with the interpretation(s) as documented by the resident/fellow, and have edited the corresponding report(s) as necessary.   I formulated and edited as necessary the assessment and plan and discussed the findings and management plan with the patient and family    Melissa Wilburn MD, PhD  , Vitreoretinal Surgery  Department of Ophthalmology  [facility]

## 2019-08-31 ENCOUNTER — TELEPHONE (OUTPATIENT)
Dept: OPHTHALMOLOGY | Facility: CLINIC | Age: 39
End: 2019-08-31

## 2019-09-01 NOTE — TELEPHONE ENCOUNTER
Received phone call from this patient regarding follow up concerns.  His right eye has been improving, with resolution of peripheral vision phenomenon related to [facility].  However, since the injury, he has had sensation of pressure with mild discomfort in the inferotemporal area of original injury after being struck by a tennis ball.  This is brought on by physical exertion or bending at the waist.  This has been improving over time, but he called to make sure this wasn't an abnormal finding.  Given patient's gradual improvement in vision and discomfort, it is likely that he is healing appropriately from commotio.  He has no vision changes/flashes/floaters.  He is comfortable with keeping his 3 month follow up appointment with retina clinic, and I offered to see him sooner if he wished, but he is comfortable waiting given steady improvement in symptoms.       Gallo Rodney, PGY2  Ophthalmology Resident    Discussed with Dr. ZHENG Gomez

## 2019-10-04 ENCOUNTER — OFFICE VISIT (OUTPATIENT)
Dept: OPTOMETRY | Facility: CLINIC | Age: 39
End: 2019-10-04
Payer: COMMERCIAL

## 2019-10-04 DIAGNOSIS — H20.9 IRITIS: Primary | ICD-10-CM

## 2019-10-04 PROCEDURE — 92002 INTRM OPH EXAM NEW PATIENT: CPT | Performed by: OPTOMETRIST

## 2019-10-04 RX ORDER — LOTEPREDNOL ETABONATE 5 MG/ML
1 SUSPENSION/ DROPS OPHTHALMIC 4 TIMES DAILY
Qty: 1 BOTTLE | Refills: 0 | Status: SHIPPED | OUTPATIENT
Start: 2019-10-04 | End: 2019-10-11

## 2019-10-04 ASSESSMENT — VISUAL ACUITY
OD_SC: 20/20
OS_SC: 20/20
METHOD: SNELLEN - LINEAR

## 2019-10-04 ASSESSMENT — TONOMETRY
IOP_METHOD: TONOPEN
OD_IOP_MMHG: 11

## 2019-10-04 ASSESSMENT — CUP TO DISC RATIO
OS_RATIO: 0.55
OD_RATIO: 0.6

## 2019-10-04 ASSESSMENT — SLIT LAMP EXAM - LIDS
COMMENTS: NORMAL
COMMENTS: NORMAL

## 2019-10-04 ASSESSMENT — EXTERNAL EXAM - RIGHT EYE: OD_EXAM: NORMAL

## 2019-10-04 ASSESSMENT — EXTERNAL EXAM - LEFT EYE: OS_EXAM: NORMAL

## 2019-10-04 NOTE — PATIENT INSTRUCTIONS
Lotemax- 1 drop right eye 4 x day for 1 week.    Follow up with the eye clinic at the [facility] if signs/symptoms increase.    Recommend annual eye exams and glaucoma testing.  OCT/Visual Field.    Stefan Flores, JUAN F

## 2019-10-04 NOTE — PROGRESS NOTES
CHIEF COMPLAINT:   Chief Complaint   Patient presents with     Eye Problem Right Eye     Patient stopped in eye clinic with complaints of eye pain. He is an endocrine physician.  He was hit in right eye with tennis ball in August.  Saw the retinal specialist at the [facility].  Had [facility].  Stopped exercising for about 1 month.  This last week started intense interval training.  Today his right eye started feeling very painful- below eye and radiates across the temple.    He has a history of episcleritis a long time ago along with a steroid responder with fluoromethelone. He does not know how high his pressure was but had halos.    Has a history of large C/Ds.    OBJECTIVE:     See ophthalmology exam    ASSESSMENT:         ICD-10-CM    1. Iritis H20.9 loteprednol (LOTEMAX) 0.5 % ophthalmic suspension    Subclinical       PLAN:      Patient Instructions   Lotemax- 1 drop right eye 4 x day for 1 week.    Follow up with the eye clinic at the [facility] if signs/symptoms increase.    Recommend annual eye exams and glaucoma testing.  OCT/Visual Field.    Stefan Flores, OD

## 2019-11-25 DIAGNOSIS — S05.8X1A COMMOTIO RETINAE OF RIGHT EYE, INITIAL ENCOUNTER: Primary | ICD-10-CM

## 2019-11-26 ENCOUNTER — OFFICE VISIT (OUTPATIENT)
Dept: OPHTHALMOLOGY | Facility: CLINIC | Age: 39
End: 2019-11-26
Attending: OPHTHALMOLOGY
Payer: COMMERCIAL

## 2019-11-26 DIAGNOSIS — S05.8X1D COMMOTIO RETINAE OF RIGHT EYE, SUBSEQUENT ENCOUNTER: Primary | ICD-10-CM

## 2019-11-26 DIAGNOSIS — H47.399 OPTIC DISC CUPPING NOT DUE TO GLAUCOMA: ICD-10-CM

## 2019-11-26 PROCEDURE — G0463 HOSPITAL OUTPT CLINIC VISIT: HCPCS | Mod: ZF

## 2019-11-26 PROCEDURE — 92134 CPTRZ OPH DX IMG PST SGM RTA: CPT | Mod: ZF | Performed by: OPHTHALMOLOGY

## 2019-11-26 ASSESSMENT — CUP TO DISC RATIO
OD_RATIO: 0.55
OS_RATIO: 0.55

## 2019-11-26 ASSESSMENT — VISUAL ACUITY
OD_SC: 20/20
OS_SC+: -1
METHOD: SNELLEN - LINEAR
OS_SC: 20/20

## 2019-11-26 ASSESSMENT — CONF VISUAL FIELD
OS_NORMAL: 1
OD_NORMAL: 1
METHOD: COUNTING FINGERS

## 2019-11-26 ASSESSMENT — EXTERNAL EXAM - LEFT EYE: OS_EXAM: NORMAL

## 2019-11-26 ASSESSMENT — SLIT LAMP EXAM - LIDS
COMMENTS: NORMAL
COMMENTS: NORMAL

## 2019-11-26 ASSESSMENT — TONOMETRY
OS_IOP_MMHG: 18
OD_IOP_MMHG: 16
IOP_METHOD: TONOPEN

## 2019-11-26 ASSESSMENT — EXTERNAL EXAM - RIGHT EYE: OD_EXAM: NORMAL

## 2019-11-26 NOTE — PROGRESS NOTES
CC -   F/u from [facility]    INTERVAL HISTORY - States vision much better since last visit. Intermittently notes a dark area in the superior visual field in the right eye - improving. 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 Seen by optometry at [place] 10/4/19 and thought to have iritis - treated with Lotemax for 1 week then stopped. Denies pain, discomfort, new floaters or flashes of light.    [facility] -   Henrik Dempsey is a  39 year old year-old patient referred by Dr Rodney. He was struck with a tennis ball in the right eye around 8-18-19. Superior VFD after impact.     Immediately after being struck, he developed a dark spot in his vision superiorly from 11:00 to 2:00 in the right eye.  Denies associated flashes of light.  No floaters.  This spot has remained stable since onset.  He had mild discomfort from the injury initially, which has continued to improve with time.  He has some redness inferotemporally in the right eye where the ball struck him.  He states that his visual acuity is unaffected.      Notes most problematic in low light situations. Reports spot is gray, and that he can see through the spot when he looks through it. Thinks spot is becoming less noticeable. No flashes, floaters. No distortion.   POHx: episcleritis ~15 years ago, treated w/ steroid gtts, had steroid-induced IOP spike, no recurrence since ~2015    PAST OCULAR SURGERY  None    RETINAL IMAGING:  OCT  11-26-19  OD - normal, PHF attached  OS - normal, PHF attached      ASSESSMENT & PLAN    1. Blunt trauma OD   - hit by tennis ball 8/2019   - mild commotio, no RD/RT, no hyphema, gonio OD normal     - advised S/Sx RD 8/2019    2. H/o Commotio OD   - inferior, noticing intermittent VFD   - improved   - observe    3. 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 Vitreous syneresis  OU      5. ?h/o iritis   - noted 10/2019 per patient   - no findings today      return to clinic: 6 months for repeat DFE    Jozef Gomez MD  Ophthalmology Resident, PGY-3      ATTESTATION     Attending Physician Attestation:      Complete documentation of historical and exam elements from today's encounter can be found in the full encounter summary report (not reduplicated in this progress note).  I personally obtained the chief complaint(s) and history of present illness.  I confirmed and edited as necessary the review of systems, past medical/surgical history, family history, social history, and examination findings as documented by others; and I examined the patient myself.  I personally reviewed the relevant tests, images, and reports as documented above.  I personally reviewed the ophthalmic test(s) associated with this encounter, agree with the interpretation(s) as documented by the resident/fellow, and have edited the corresponding report(s) as necessary.   I formulated and edited as necessary the assessment and plan and discussed the findings and management plan with the patient and family    Melissa Wilburn MD, PhD  , Vitreoretinal Surgery  Department of Ophthalmology  [facility]

## 2019-11-26 NOTE — NURSING NOTE
Chief Complaints and History of Present Illnesses   Patient presents with     Retinal Evaluation     Chief Complaint(s) and History of Present Illness(es)     Retinal Evaluation     Laterality: left eye    Onset: gradual    Onset: months ago    Quality: States va is the same since last visit      Frequency: constantly    Associated symptoms: Negative for floaters and flashes    Pain scale: 0/10              Comments     [facility]   Nathalie Manley COT 7:57 AM November 26, 2019